# Patient Record
Sex: FEMALE | Race: BLACK OR AFRICAN AMERICAN | URBAN - METROPOLITAN AREA
[De-identification: names, ages, dates, MRNs, and addresses within clinical notes are randomized per-mention and may not be internally consistent; named-entity substitution may affect disease eponyms.]

---

## 2023-02-06 ENCOUNTER — OFFICE VISIT (OUTPATIENT)
Dept: URBAN - METROPOLITAN AREA CLINIC 74 | Facility: CLINIC | Age: 38
End: 2023-02-06

## 2023-02-08 ENCOUNTER — OFFICE VISIT (OUTPATIENT)
Dept: URBAN - METROPOLITAN AREA CLINIC 74 | Facility: CLINIC | Age: 38
End: 2023-02-08

## 2023-02-14 ENCOUNTER — OFFICE VISIT (OUTPATIENT)
Dept: URBAN - METROPOLITAN AREA CLINIC 74 | Facility: CLINIC | Age: 38
End: 2023-02-14

## 2023-02-14 NOTE — HPI-TODAY'S VISIT:
The patient is 38-year-old female with no significant past medical history is presenting to our clinic today for evaluation of IBS with alternating bowel movement, abdominal bloating, and cramps

## 2023-03-07 PROBLEM — 440544005: Status: ACTIVE | Noted: 2023-02-07

## 2023-03-22 ENCOUNTER — OFFICE VISIT (OUTPATIENT)
Dept: URBAN - METROPOLITAN AREA CLINIC 74 | Facility: CLINIC | Age: 38
End: 2023-03-22
Payer: COMMERCIAL

## 2023-03-22 ENCOUNTER — LAB OUTSIDE AN ENCOUNTER (OUTPATIENT)
Dept: URBAN - METROPOLITAN AREA CLINIC 74 | Facility: CLINIC | Age: 38
End: 2023-03-22

## 2023-03-22 ENCOUNTER — WEB ENCOUNTER (OUTPATIENT)
Dept: URBAN - METROPOLITAN AREA CLINIC 74 | Facility: CLINIC | Age: 38
End: 2023-03-22

## 2023-03-22 VITALS
DIASTOLIC BLOOD PRESSURE: 80 MMHG | WEIGHT: 155.2 LBS | BODY MASS INDEX: 26.5 KG/M2 | SYSTOLIC BLOOD PRESSURE: 120 MMHG | HEART RATE: 78 BPM | TEMPERATURE: 97.7 F | HEIGHT: 64 IN

## 2023-03-22 DIAGNOSIS — R19.8 ALTERNATING CONSTIPATION AND DIARRHEA: ICD-10-CM

## 2023-03-22 DIAGNOSIS — R14.0 ABDOMINAL BLOATING: ICD-10-CM

## 2023-03-22 DIAGNOSIS — K58.2 IRRITABLE BOWEL SYNDROME WITH ALTERNATING BOWEL HABITS: ICD-10-CM

## 2023-03-22 PROCEDURE — 99204 OFFICE O/P NEW MOD 45 MIN: CPT | Performed by: PHYSICIAN ASSISTANT

## 2023-03-22 RX ORDER — POLYETHYLENE GLYCOL 3350 17 G/17G
AS DIRECTED POWDER, FOR SOLUTION ORAL
Qty: 238 G | Refills: 0 | OUTPATIENT
Start: 2023-03-22 | End: 2023-03-23

## 2023-03-22 RX ORDER — DICYCLOMINE HYDROCHLORIDE 10 MG/1
1 CAPSULE 30 MINUTES BEFORE EATING CAPSULE ORAL THREE TIMES A DAY
Qty: 90 | Refills: 3 | OUTPATIENT
Start: 2023-03-22 | End: 2023-07-20

## 2023-03-22 NOTE — PHYSICAL EXAM GASTROINTESTINAL
Abdomen , soft, tender at lower abdomen, nondistended , no guarding or rigidity , no masses palpable , normal bowel sounds , Liver and Spleen , no hepatomegaly present , no hepatosplenomegaly , liver nontender , spleen not palpable

## 2023-03-22 NOTE — HPI-TODAY'S VISIT:
The patient is 38-year-old female with no significant past medical history is presenting to our clinic today for evaluation of IBS with alternating bowel movement, abdominal bloating, and cramps.  The patient has a long history of lower abdominal cramping radiates to upper back and lower back. She has excessive with urgent bowel movements. She has changed her diet with no improvement. No improvement noted on changing diet. She has one soft bowel movements after meala and sometimes, she has constipation for 2-3 days. No rectal bleeding. She denies using any OTC medications. No previous GI work up. Paternal grandmother with Crohn's disease.  -- The patient denies dyspepsia, dysphagia, odynophagia, hemoptysis, hematemesis, nausea, vomiting, regurgitation, melena, hematochezia, fever, chills, chest pain, SOB, or any other GI complaints today.  -- The patient denies ETOH, Tobacco, and Illicit drug use.  -- The patient is not up to date with Flu and COVID vaccine. -- Denies NSAID's.

## 2023-03-23 ENCOUNTER — TELEPHONE ENCOUNTER (OUTPATIENT)
Dept: URBAN - METROPOLITAN AREA CLINIC 35 | Facility: CLINIC | Age: 38
End: 2023-03-23

## 2023-03-23 LAB
A/G RATIO: 1.5
ABSOLUTE BASOPHILS: 39
ABSOLUTE EOSINOPHILS: 151
ABSOLUTE LYMPHOCYTES: 1904
ABSOLUTE MONOCYTES: 683
ABSOLUTE NEUTROPHILS: 2822
ALBUMIN: 4.5
ALKALINE PHOSPHATASE: 79
ALT (SGPT): 17
AMYLASE: 37
AST (SGOT): 16
BASOPHILS: 0.7
BILIRUBIN, TOTAL: 1
BUN/CREATININE RATIO: (no result)
BUN: 17
CALCIUM: 10.6
CARBON DIOXIDE, TOTAL: 30
CHLORIDE: 102
CREATININE: 0.87
EGFR: 88
EOSINOPHILS: 2.7
GLOBULIN, TOTAL: 3
GLUCOSE: 68
HEMATOCRIT: 43.7
HEMOGLOBIN: 14.5
LIPASE: 15
LYMPHOCYTES: 34
MCH: 29.5
MCHC: 33.2
MCV: 88.8
MONOCYTES: 12.2
MPV: 10.7
NEUTROPHILS: 50.4
PLATELET COUNT: 318
POTASSIUM: 5
PROTEIN, TOTAL: 7.5
RDW: 12.5
RED BLOOD CELL COUNT: 4.92
SODIUM: 141
WHITE BLOOD CELL COUNT: 5.6

## 2023-03-28 ENCOUNTER — OFFICE VISIT (OUTPATIENT)
Dept: URBAN - METROPOLITAN AREA CLINIC 73 | Facility: CLINIC | Age: 38
End: 2023-03-28

## 2023-04-03 ENCOUNTER — DASHBOARD ENCOUNTERS (OUTPATIENT)
Age: 38
End: 2023-04-03

## 2023-04-06 ENCOUNTER — ERX REFILL RESPONSE (OUTPATIENT)
Dept: URBAN - METROPOLITAN AREA CLINIC 74 | Facility: CLINIC | Age: 38
End: 2023-04-06

## 2023-04-06 RX ORDER — DICYCLOMINE HYDROCHLORIDE 10 MG/1
1 CAPSULE 30 MINUTES BEFORE EATING ORALLY THREE TIMES A DAY 30 DAYS CAPSULE ORAL
Qty: 270 CAPSULE | Refills: 2 | OUTPATIENT

## 2023-04-06 RX ORDER — DICYCLOMINE HYDROCHLORIDE 10 MG/1
1 CAPSULE 30 MINUTES BEFORE EATING CAPSULE ORAL THREE TIMES A DAY
Qty: 90 | Refills: 3 | OUTPATIENT

## 2023-04-07 ENCOUNTER — OFFICE VISIT (OUTPATIENT)
Dept: URBAN - METROPOLITAN AREA CLINIC 74 | Facility: CLINIC | Age: 38
End: 2023-04-07

## 2023-06-06 ENCOUNTER — OFFICE VISIT (OUTPATIENT)
Dept: URBAN - METROPOLITAN AREA SURGERY CENTER 30 | Facility: SURGERY CENTER | Age: 38
End: 2023-06-06

## 2023-07-05 ENCOUNTER — OFFICE VISIT (OUTPATIENT)
Dept: URBAN - METROPOLITAN AREA CLINIC 74 | Facility: CLINIC | Age: 38
End: 2023-07-05

## 2023-07-05 RX ORDER — DICYCLOMINE HYDROCHLORIDE 10 MG/1
1 CAPSULE 30 MINUTES BEFORE EATING ORALLY THREE TIMES A DAY 30 DAYS CAPSULE ORAL
Qty: 270 CAPSULE | Refills: 2 | Status: ACTIVE | COMMUNITY

## 2023-07-05 NOTE — HPI-TODAY'S VISIT:
The patient is 38-year-old female with no significant past medical history known to Dr. Pitts is presenting to our clinic today to discuss her recent Colonoscopy and lab results.  -- The patient denies dyspepsia, dysphagia, odynophagia, hemoptysis, hematemesis, nausea, vomiting, regurgitation, melena, hematochezia, fever, chills, chest pain, SOB, or any other GI complaints today.  -- The patient denies ETOH, Tobacco, and Illicit drug use.  -- The patient is not up to date with Flu and COVID vaccine. -- Denies NSAID's.  Diagnostic studies: -- Labs on 03/22/2023 CBC, CMP, Amylase, and Lipase anne normal.

## 2023-07-05 NOTE — PHYSICAL EXAM GASTROINTESTINAL
Abdomen , soft, nontender, nondistended , no guarding or rigidity , no masses palpable , normal bowel sounds , Liver and Spleen,  no hepatosplenomegaly , liver nontender
Yes